# Patient Record
(demographics unavailable — no encounter records)

---

## 2025-07-17 NOTE — HISTORY OF PRESENT ILLNESS
[Mother] : mother [Has friends] : has friends [At least 1 hour of physical activity a day] : at least 1 hour of physical activity a day [Grade: ____] : Grade: [unfilled] [No] : No cigarette smoke exposure [Needs Immunizations] : Needs immunizations [Normal] : normal [LMP: _____] : LMP: [unfilled] [Cycle Length: _____ days] : Cycle Length: [unfilled] days [Days of Bleeding: _____] : Days of bleeding: [unfilled] [Eats meals with family] : eats meals with family [Has family members/adults to turn to for help] : has family members/adults to turn to for help [Is permitted and is able to make independent decisions] : Is permitted and is able to make independent decisions [Normal Performance] : normal performance [Normal Homework] : normal homework [Uses safety belts/safety equipment] : uses safety belts/safety equipment  [Has peer relationships free of violence] : has peer relationships free of violence [Yes] : Patient has had sexual intercourse. [HIV Screening Declined] : HIV Screening Declined [Has ways to cope with stress] : has ways to cope with stress [Displays self-confidence] : displays self-confidence [With Teen] : teen [NO] : No [Irregular menses] : no irregular menses [Painful Cramps] : no painful cramps [Sleep Concerns] : no sleep concerns [Uses electronic nicotine delivery system] : does not use electronic nicotine delivery system [Exposure to electronic nicotine delivery system] : no exposure to electronic nicotine delivery system [Uses tobacco] : does not use tobacco [Exposure to tobacco] : no exposure to tobacco [Uses drugs] : does not use drugs  [Exposure to drugs] : no exposure to drugs [Drinks alcohol] : does not drink alcohol [Exposure to alcohol] : no exposure to alcohol [Impaired/distracted driving] : no impaired/distracted driving [Has problems with sleep] : does not have problems with sleep [Gets depressed, anxious, or irritable/has mood swings] : does not get depressed, anxious, or irritable/has mood swings [Has thought about hurting self or considered suicide] : has not thought about hurting self or considered suicide [FreeTextEntry7] : Patient has been doing well, no recent illness, no ED visits, no known sick contacts and no recent travel.  [de-identified] : No acute concerns  [de-identified] : Men B [de-identified] : Has a hard time keeping a healthy diet at college [de-identified] : Has 1 boyfriend, 1 previous before, consistent condom use, consents to screening

## 2025-07-17 NOTE — DISCUSSION/SUMMARY
[Physical Growth and Development] : physical growth and development [Social and Academic Competence] : social and academic competence [Emotional Well-Being] : emotional well-being [Risk Reduction] : risk reduction [Violence and Injury Prevention] : violence and injury prevention [Full Activity without restrictions including Physical Education & Athletics] : Full Activity without restrictions including Physical Education & Athletics [] : The components of the vaccine(s) to be administered today are listed in the plan of care. The disease(s) for which the vaccine(s) are intended to prevent and the risks have been discussed with the caretaker.  The risks are also included in the appropriate vaccination information statements which have been provided to the patient's caregiver.  The caregiver has given consent to vaccinate. [FreeTextEntry1] : 18 yr old female here for annual physical She is overweight and struggling with healthy meal options at college, goes home on the weekends. Minimal exercise. Long term risks of being overweight were discussed with parent. Patient was advised to make dietary changes such as decrease portion size, increase number of vegetables offered, elimination of juice and soda, and transition to whole grain cereal, rice and bread. Also advised to include at least 5 servings of fruits/vegetables a day, limit screen time to < 2hr/day, provide at least 1hr of physical activity per day and decrease intake of sugary drinks to 0.  CBC, A1C, AST/ALT, TSH and Lipid panel ordered today Received Men B vaccine today - tolerated well Passed hearing and vision Cardiac screen negative GAD7, PHQ9 and CRAFFT questionnaires negative Consents to STI screen today, HIV and GC/Chlam ordered SDOH family wellness screen reviewed   Anticipatory guidance discussed: Recommended continue balanced diet with all food groups. Brush teeth twice a day with toothbrush. Recommend visit to dentist. Maintain consistent daily routines and sleep schedule. Limit screen time to no more than 2 hours per day. Personal hygiene, puberty, and sexual health reviewed. Risky behaviors assessed. School discussed. Encourage physical activity.   Return 1 year for routine well child check.

## 2025-07-17 NOTE — PHYSICAL EXAM

## 2025-07-17 NOTE — HISTORY OF PRESENT ILLNESS
[Mother] : mother [Has friends] : has friends [At least 1 hour of physical activity a day] : at least 1 hour of physical activity a day [Grade: ____] : Grade: [unfilled] [No] : No cigarette smoke exposure [Needs Immunizations] : Needs immunizations [Normal] : normal [LMP: _____] : LMP: [unfilled] [Cycle Length: _____ days] : Cycle Length: [unfilled] days [Days of Bleeding: _____] : Days of bleeding: [unfilled] [Eats meals with family] : eats meals with family [Has family members/adults to turn to for help] : has family members/adults to turn to for help [Is permitted and is able to make independent decisions] : Is permitted and is able to make independent decisions [Normal Performance] : normal performance [Normal Homework] : normal homework [Uses safety belts/safety equipment] : uses safety belts/safety equipment  [Has peer relationships free of violence] : has peer relationships free of violence [Yes] : Patient has had sexual intercourse. [HIV Screening Declined] : HIV Screening Declined [Has ways to cope with stress] : has ways to cope with stress [Displays self-confidence] : displays self-confidence [With Teen] : teen [NO] : No [Irregular menses] : no irregular menses [Painful Cramps] : no painful cramps [Sleep Concerns] : no sleep concerns [Uses electronic nicotine delivery system] : does not use electronic nicotine delivery system [Exposure to electronic nicotine delivery system] : no exposure to electronic nicotine delivery system [Uses tobacco] : does not use tobacco [Exposure to tobacco] : no exposure to tobacco [Uses drugs] : does not use drugs  [Exposure to drugs] : no exposure to drugs [Drinks alcohol] : does not drink alcohol [Exposure to alcohol] : no exposure to alcohol [Impaired/distracted driving] : no impaired/distracted driving [Has problems with sleep] : does not have problems with sleep [Gets depressed, anxious, or irritable/has mood swings] : does not get depressed, anxious, or irritable/has mood swings [Has thought about hurting self or considered suicide] : has not thought about hurting self or considered suicide [FreeTextEntry7] : Patient has been doing well, no recent illness, no ED visits, no known sick contacts and no recent travel.  [de-identified] : No acute concerns  [de-identified] : Men B [de-identified] : Has a hard time keeping a healthy diet at college [de-identified] : Has 1 boyfriend, 1 previous before, consistent condom use, consents to screening